# Patient Record
Sex: MALE | Race: BLACK OR AFRICAN AMERICAN | NOT HISPANIC OR LATINO | Employment: UNEMPLOYED | URBAN - METROPOLITAN AREA
[De-identification: names, ages, dates, MRNs, and addresses within clinical notes are randomized per-mention and may not be internally consistent; named-entity substitution may affect disease eponyms.]

---

## 2018-11-19 ENCOUNTER — HOSPITAL ENCOUNTER (EMERGENCY)
Facility: HOSPITAL | Age: 23
Discharge: HOME | End: 2018-11-19
Attending: EMERGENCY MEDICINE
Payer: MEDICAID

## 2018-11-19 VITALS
RESPIRATION RATE: 16 BRPM | TEMPERATURE: 99 F | HEIGHT: 71 IN | WEIGHT: 108 LBS | DIASTOLIC BLOOD PRESSURE: 66 MMHG | OXYGEN SATURATION: 97 % | HEART RATE: 91 BPM | SYSTOLIC BLOOD PRESSURE: 120 MMHG | BODY MASS INDEX: 15.12 KG/M2

## 2018-11-19 DIAGNOSIS — R21 PENILE RASH: Primary | ICD-10-CM

## 2018-11-19 DIAGNOSIS — R36.9 PENILE DISCHARGE: ICD-10-CM

## 2018-11-19 DIAGNOSIS — R59.0 LYMPHADENOPATHY, INGUINAL: ICD-10-CM

## 2018-11-19 LAB
BACTERIA #/AREA URNS HPF: ABNORMAL /HPF
BILIRUB UR QL STRIP: NEGATIVE
CLARITY UR: CLEAR
COLOR UR: YELLOW
GLUCOSE UR QL STRIP: NEGATIVE
HGB UR QL STRIP: ABNORMAL
KETONES UR QL STRIP: NEGATIVE
LEUKOCYTE ESTERASE UR QL STRIP: ABNORMAL
MICROSCOPIC COMMENT: ABNORMAL
NITRITE UR QL STRIP: NEGATIVE
PH UR STRIP: 6 [PH] (ref 5–8)
PROT UR QL STRIP: NEGATIVE
RBC #/AREA URNS HPF: 1 /HPF (ref 0–4)
SP GR UR STRIP: >=1.03 (ref 1–1.03)
SQUAMOUS #/AREA URNS HPF: 1 /HPF
URN SPEC COLLECT METH UR: ABNORMAL
UROBILINOGEN UR STRIP-ACNC: NEGATIVE EU/DL
WBC #/AREA URNS HPF: 25 /HPF (ref 0–5)

## 2018-11-19 PROCEDURE — 99284 EMERGENCY DEPT VISIT MOD MDM: CPT | Mod: 25

## 2018-11-19 PROCEDURE — 87086 URINE CULTURE/COLONY COUNT: CPT

## 2018-11-19 PROCEDURE — 81000 URINALYSIS NONAUTO W/SCOPE: CPT

## 2018-11-19 PROCEDURE — 87491 CHLMYD TRACH DNA AMP PROBE: CPT

## 2018-11-19 PROCEDURE — 87529 HSV DNA AMP PROBE: CPT

## 2018-11-19 PROCEDURE — 63600175 PHARM REV CODE 636 W HCPCS: Performed by: NURSE PRACTITIONER

## 2018-11-19 PROCEDURE — 96372 THER/PROPH/DIAG INJ SC/IM: CPT

## 2018-11-19 PROCEDURE — 25000003 PHARM REV CODE 250: Performed by: NURSE PRACTITIONER

## 2018-11-19 RX ORDER — ACYCLOVIR 200 MG/1
400 CAPSULE ORAL
Status: COMPLETED | OUTPATIENT
Start: 2018-11-19 | End: 2018-11-19

## 2018-11-19 RX ORDER — CEFTRIAXONE 500 MG/1
250 INJECTION, POWDER, FOR SOLUTION INTRAMUSCULAR; INTRAVENOUS
Status: COMPLETED | OUTPATIENT
Start: 2018-11-19 | End: 2018-11-19

## 2018-11-19 RX ORDER — ACYCLOVIR 400 MG/1
400 TABLET ORAL 3 TIMES DAILY
Qty: 21 TABLET | Refills: 0 | Status: SHIPPED | OUTPATIENT
Start: 2018-11-19 | End: 2018-11-26

## 2018-11-19 RX ORDER — AZITHROMYCIN 250 MG/1
1000 TABLET, FILM COATED ORAL
Status: COMPLETED | OUTPATIENT
Start: 2018-11-19 | End: 2018-11-19

## 2018-11-19 RX ADMIN — AZITHROMYCIN 1000 MG: 250 TABLET, FILM COATED ORAL at 05:11

## 2018-11-19 RX ADMIN — CEFTRIAXONE SODIUM 250 MG: 500 INJECTION, POWDER, FOR SOLUTION INTRAMUSCULAR; INTRAVENOUS at 05:11

## 2018-11-19 RX ADMIN — ACYCLOVIR 400 MG: 200 CAPSULE ORAL at 05:11

## 2018-11-19 NOTE — ED PROVIDER NOTES
Encounter Date: 11/19/2018    SCRIBE #1 NOTE: I, Isaac Adriano, am scribing for, and in the presence of, Zulma GREER.       History     Chief Complaint   Patient presents with    Groin Swelling     R groin x 3 weeks. Seen in [place] for [place].     Time seen by provider: 4:35 PM on 11/19/2018      Dallin Busch is a 23 y.o. male with no medical history who presents to the ED for further evaluation of groin swelling that started 3 weeks ago.The patient reports that he was seen 3 weeks ago by Dr. Alex for HIV testing, due to significant other, male, having HIV. He states that he is having right sided groin swelling that has slightly been worsening since onset. He admits that 1.5 weeks ago he began to have pain with urination at the tip of the penis. The patient reports that he also has a sore with drainage underneath his foreskin. He states that the sore is itchy. Patient states that he has only had unprotected sex with his BF. He reports that the BF only has HIV to the patient's knowledge. The patient denies fever, chills, nausea, vomiting, and diarrhea. Patient has no pertinent PSHx.         The history is provided by the patient.     Review of patient's allergies indicates:  No Known Allergies  History reviewed. No pertinent past medical history.  History reviewed. No pertinent surgical history.  History reviewed. No pertinent family history.  Social History     Tobacco Use    Smoking status: Current Every Day Smoker     Packs/day: 1.00     Types: Cigarettes   Substance Use Topics    Alcohol use: Not on file    Drug use: Not on file     Review of Systems   Constitutional: Negative for chills and fever.   HENT: Negative for trouble swallowing.    Respiratory: Negative for cough, chest tightness, shortness of breath and wheezing.    Cardiovascular: Negative for chest pain and palpitations.   Gastrointestinal: Negative for abdominal pain, diarrhea, nausea and vomiting.   Genitourinary: Positive for  discharge, dysuria and penile pain. Negative for hematuria.   Musculoskeletal: Negative for neck pain and neck stiffness.   Skin: Positive for wound. Negative for rash.        Sore on the penis   Neurological: Negative for dizziness, syncope, weakness, light-headedness, numbness and headaches.   Hematological: Positive for adenopathy. Does not bruise/bleed easily.   Psychiatric/Behavioral: The patient is not nervous/anxious.        Physical Exam     Initial Vitals [11/19/18 1605]   BP Pulse Resp Temp SpO2   120/66 (!) 126 16 98.5 °F (36.9 °C) 97 %      MAP       --         Physical Exam    Nursing note and vitals reviewed.  Constitutional: He appears well-developed and well-nourished. He is not diaphoretic.  Non-toxic appearance. He does not have a sickly appearance. He does not appear ill. No distress.   HENT:   Head: Normocephalic and atraumatic.   Eyes: Conjunctivae and EOM are normal. Pupils are equal, round, and reactive to light.   Neck: Normal range of motion. Neck supple.   Cardiovascular: Normal rate, regular rhythm, normal heart sounds and intact distal pulses. Exam reveals no gallop and no friction rub.    No murmur heard.  Pulmonary/Chest: Breath sounds normal. No respiratory distress. He has no wheezes. He has no rhonchi. He has no rales.   Abdominal: Soft. He exhibits no distension and no mass. There is no tenderness.   Genitourinary: Discharge found.   Genitourinary Comments: Right inguinal lymphadenopathy.  Several small ulcerated lesions noted to dorsum of glands penis,scant amount of clear discharge visible.  No discharge from the meatus.  No chancre.   Musculoskeletal: Normal range of motion. He exhibits no edema or tenderness.   Lymphadenopathy:     He has no cervical adenopathy. Inguinal adenopathy noted on the right side.   Neurological: He is alert and oriented to person, place, and time. He has normal strength. No cranial nerve deficit or sensory deficit. GCS score is 15. GCS eye subscore is  4. GCS verbal subscore is 5. GCS motor subscore is 6.   Skin: Skin is warm and dry. No rash and no abscess noted. No erythema.   Psychiatric: He has a normal mood and affect.         ED Course   Procedures  Labs Reviewed   URINALYSIS, REFLEX TO URINE CULTURE - Abnormal; Notable for the following components:       Result Value    Specific Gravity, UA >=1.030 (*)     Occult Blood UA Trace (*)     Leukocytes, UA 1+ (*)     All other components within normal limits    Narrative:     Preferred Collection Type->Urine, Clean Catch   URINALYSIS MICROSCOPIC - Abnormal; Notable for the following components:    WBC, UA 25 (*)     All other components within normal limits    Narrative:     Preferred Collection Type->Urine, Clean Catch   C. TRACHOMATIS/N. GONORRHOEAE BY AMP DNA   CULTURE, URINE   HERPES SIMPLEX (HSV) BY RAPID PCR, NON-BLOOD    Narrative:     Receiving Lab?->[facility]          Imaging Results    None          Medical Decision Making:   History:   Old Medical Records: I decided to obtain old medical records.  Initial Assessment:   Records from [facility] reviewed from 2016  Patient reports a recent clinic visit but these records were not received  Differential Diagnosis:   HSV 1  HSV 2  GC  Chlamydia   UTI  Syphilis   Clinical Tests:   Lab Tests: Ordered and Reviewed       APC / Resident Notes:   Herpes viral swab and GC/Chlamydia urine sent. Treated pt with first dose of acyclovir in ED and will give rx for 7 day treatment.  Pt  Opted to receive azithromycin and rocephin treatment in   ED. Pt instructed to f/u with PCP and infectious disease as previously planned.  Pt instructed to refrain from unprotected sex until partner treated for the same. Pt voices understanding and is agreeable to the plan.  He is given specific return precautions.        Scribe Attestation:   Scribe #1: I performed the above scribed service and the documentation accurately describes the services I performed. I attest to the accuracy of the  note.    Attending Attestation:     Physician Attestation Statement for NP/PA:   I have conducted a face to face encounter with this patient in addition to the NP/PA, due to Medical Complexity    Other NP/PA Attestation Additions:      Medical Decision Making: I provided a face to face evaluation of this patient.  I discussed the patient's care with Advanced Practice Clinician.  I reviewed their note and agree with the history, physical, assessment, diagnosis, treatment, and discharge plan provided by the Advanced Practice Clinician. My overall impression is penile ulcer.  The patient has been instructed to follow up with their physician or the one provided as well as specific return precautions.            I, ZOEY Garcia, personally performed the services described in this documentation. All medical record entries made by the scribe were at my direction and in my presence.  I have reviewed the chart and agree that the record reflects my personal performance and is accurate and complete. ZOEY Garcia.  6:13 PM 11/19/2018        ED Course as of Nov 19 1814   Mon Nov 19, 2018   1745 23-year-old presents to the emergency room with right inguinal lymphadenopathy and ulcerated lesions to the head of the penis.  These began after recent same sex oral sex.  Patient reports his partner is HIV positive. Patient went to [facility] for screening labs before resuming a sexual relationship with this person but does not know the results.  His sexual partner is HIV positive.  Patient will be treated here in the emergency room with acyclovir for presumptive herpes.  Patient has a penile discharge as well and will be given azithromycin and Rocephin.  Patient understands the importance of following back up with primary care.  Abstain from sexual intercourse until he follows up with Infectious Disease at [facility].  [EF]      ED Course User Index  [EF] Fam Lawler MD     Clinical Impression:   The primary encounter  diagnosis was Penile rash. Diagnoses of Penile discharge and Lymphadenopathy, inguinal were also pertinent to this visit.      Disposition:   Disposition: Discharged  Condition: Stable                        Zulma Garcia NP  11/19/18 [id number]       Fam Lawler MD  11/21/18 1947

## 2018-11-19 NOTE — ED NOTES
Authorization for release of confidential information signed by Pt and faxed to peg Doty supervisor at [facility] in [place], [place].  Awaiting return fax.

## 2018-11-20 LAB
C TRACH DNA SPEC QL NAA+PROBE: NOT DETECTED
N GONORRHOEA DNA SPEC QL NAA+PROBE: DETECTED

## 2018-11-21 LAB — BACTERIA UR CULT: NO GROWTH

## 2018-11-26 LAB
HSV1 DNA SPEC QL NAA+PROBE: NEGATIVE
HSV2 DNA SPEC QL NAA+PROBE: NEGATIVE
SPECIMEN SOURCE: NORMAL